# Patient Record
Sex: FEMALE | Race: ASIAN | URBAN - METROPOLITAN AREA
[De-identification: names, ages, dates, MRNs, and addresses within clinical notes are randomized per-mention and may not be internally consistent; named-entity substitution may affect disease eponyms.]

---

## 2019-07-17 ENCOUNTER — IMPORTED ENCOUNTER (OUTPATIENT)
Dept: URBAN - METROPOLITAN AREA CLINIC 50 | Facility: CLINIC | Age: 54
End: 2019-07-17

## 2019-07-23 ENCOUNTER — IMPORTED ENCOUNTER (OUTPATIENT)
Dept: URBAN - METROPOLITAN AREA CLINIC 50 | Facility: CLINIC | Age: 54
End: 2019-07-23

## 2019-08-21 ENCOUNTER — IMPORTED ENCOUNTER (OUTPATIENT)
Dept: URBAN - METROPOLITAN AREA CLINIC 50 | Facility: CLINIC | Age: 54
End: 2019-08-21

## 2021-04-17 ASSESSMENT — VISUAL ACUITY
OD_CC: J1+
OD_CC: 20/25-2
OS_CC: 20/25-2
OS_CC: J1+

## 2021-04-17 ASSESSMENT — TONOMETRY
OS_IOP_MMHG: 16
OD_IOP_MMHG: 18

## 2021-11-04 ENCOUNTER — PREPPED CHART (OUTPATIENT)
Dept: URBAN - METROPOLITAN AREA CLINIC 53 | Facility: CLINIC | Age: 56
End: 2021-11-04

## 2021-11-08 ENCOUNTER — ROUTINE EXAM (OUTPATIENT)
Dept: URBAN - METROPOLITAN AREA CLINIC 53 | Facility: CLINIC | Age: 56
End: 2021-11-08

## 2021-11-08 DIAGNOSIS — H52.4: ICD-10-CM

## 2021-11-08 DIAGNOSIS — H40.013: ICD-10-CM

## 2021-11-08 DIAGNOSIS — H04.123: ICD-10-CM

## 2021-11-08 DIAGNOSIS — Z01.01: ICD-10-CM

## 2021-11-08 DIAGNOSIS — H25.13: ICD-10-CM

## 2021-11-08 PROCEDURE — 92014 COMPRE OPH EXAM EST PT 1/>: CPT

## 2021-11-08 PROCEDURE — 92133 CPTRZD OPH DX IMG PST SGM ON: CPT

## 2021-11-08 PROCEDURE — 92015 DETERMINE REFRACTIVE STATE: CPT

## 2021-11-08 RX ORDER — OLOPATADINE HYDROCHLORIDE 2 MG/ML
1 SOLUTION OPHTHALMIC AS NEEDED
Start: 2021-11-08

## 2021-11-08 ASSESSMENT — VISUAL ACUITY
OU_CC: 20/20-1
OU_CC: J1+(-2)
OD_CC: 20/20-2
OS_CC: 20/25-1

## 2021-11-08 ASSESSMENT — TONOMETRY
OD_IOP_MMHG: 18
OS_IOP_MMHG: 18

## 2021-11-08 NOTE — PATIENT DISCUSSION
Per moderate c/d ratio, OD>OS. IOP wnl, 18/18. (-) family hx. OCT (RNFL) done today. Borderline thinning superiorly in OS; OD wnl. Patient to schedule HVF/PACH next available.

## 2021-11-12 ENCOUNTER — DIAGNOSTICS - HVF (OUTPATIENT)
Dept: URBAN - METROPOLITAN AREA CLINIC 52 | Facility: CLINIC | Age: 56
End: 2021-11-12

## 2021-11-12 DIAGNOSIS — H40.013: ICD-10-CM

## 2021-11-12 PROCEDURE — 92083 EXTENDED VISUAL FIELD XM: CPT

## 2021-11-12 PROCEDURE — 76514 ECHO EXAM OF EYE THICKNESS: CPT

## 2021-11-12 NOTE — PATIENT DISCUSSION
Per moderate c/d ratio, OD>OS. IOP wnl, 18/18. (-) family hx. OCT RNFL (11/08/2021) shows wnl OD and borderline thinning superiorly in OS.

## 2021-12-13 ENCOUNTER — CONTACT LENSES/GLASSES VISIT (OUTPATIENT)
Dept: URBAN - METROPOLITAN AREA CLINIC 53 | Facility: CLINIC | Age: 56
End: 2021-12-13

## 2021-12-13 DIAGNOSIS — H52.4: ICD-10-CM

## 2021-12-13 PROCEDURE — 92310-3E ESTABLISHED CL PATIENT MULTIFOCAL AND/OR MONOVISION SOFT LENS EVALUATION

## 2021-12-13 ASSESSMENT — VISUAL ACUITY
OS_CC: 20/20-1
OD_CC: 20/20-2
OU_CC: 20/20

## 2021-12-13 NOTE — PATIENT DISCUSSION
Trial of contact lenses for patient given, #2. Patient also leaving with OS lens from trial #3 (white case).